# Patient Record
Sex: MALE | Race: BLACK OR AFRICAN AMERICAN | NOT HISPANIC OR LATINO | URBAN - METROPOLITAN AREA
[De-identification: names, ages, dates, MRNs, and addresses within clinical notes are randomized per-mention and may not be internally consistent; named-entity substitution may affect disease eponyms.]

---

## 2023-04-18 ENCOUNTER — HOSPITAL ENCOUNTER (EMERGENCY)
Facility: HOSPITAL | Age: 38
Discharge: HOME | End: 2023-04-18
Attending: INTERNAL MEDICINE
Payer: MEDICAID

## 2023-04-18 VITALS
WEIGHT: 315 LBS | RESPIRATION RATE: 20 BRPM | OXYGEN SATURATION: 99 % | BODY MASS INDEX: 45.1 KG/M2 | HEIGHT: 70 IN | DIASTOLIC BLOOD PRESSURE: 76 MMHG | TEMPERATURE: 97 F | HEART RATE: 106 BPM | SYSTOLIC BLOOD PRESSURE: 130 MMHG

## 2023-04-18 DIAGNOSIS — M54.50 LUMBAR PAIN: ICD-10-CM

## 2023-04-18 DIAGNOSIS — M54.50 ACUTE RIGHT-SIDED LOW BACK PAIN WITHOUT SCIATICA: Primary | ICD-10-CM

## 2023-04-18 DIAGNOSIS — M25.561 ACUTE PAIN OF RIGHT KNEE: ICD-10-CM
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
## 2023-04-18 PROCEDURE — 99284 EMERGENCY DEPT VISIT MOD MDM: CPT

## 2023-04-18 RX ORDER — CYCLOBENZAPRINE HCL 5 MG
10 TABLET ORAL 3 TIMES DAILY PRN
Qty: 20 TABLET | Refills: 0 | Status: SHIPPED | OUTPATIENT
Start: 2023-04-18 | End: 2023-04-28

## 2023-04-18 RX ORDER — IBUPROFEN 600 MG/1
600 TABLET ORAL EVERY 6 HOURS PRN
Qty: 20 TABLET | Refills: 0 | Status: SHIPPED | OUTPATIENT
Start: 2023-04-18

## 2023-04-19 NOTE — ED PROVIDER NOTES
Encounter Date: 4/18/2023       History     Chief Complaint   Patient presents with    Fall     Right knee and lower back pain after slip and fall at [facility].     38 yo male with no significant past medical history who arrives to emergency department by ambulance with complaints of right knee and right sided lumbar pain after a fall. He is sitting in wheelchair during interview. Patient states he slipped and fell forward at a restaurant prior to arrival due to wet floor. He was not able to get up on his own and had to be carried to the stretcher. Reports severe pain when he attempts to ambulate. States pain is worsened on flexion of right knee. Denies tingling, numbness, radiating pain, or head trauma.       Review of patient's allergies indicates:  No Known Allergies  No past medical history on file.  No past surgical history on file.  No family history on file.     Review of Systems   Constitutional:  Negative for chills, diaphoresis and fever.   HENT: Negative.     Eyes: Negative.    Respiratory:  Negative for shortness of breath and wheezing.    Cardiovascular:  Negative for chest pain, palpitations and leg swelling.   Gastrointestinal: Negative.    Endocrine: Negative.    Genitourinary: Negative.    Musculoskeletal:  Positive for back pain, gait problem and joint swelling. Negative for neck pain and neck stiffness.   Skin: Negative.    Allergic/Immunologic: Negative.    Neurological:  Positive for weakness. Negative for dizziness, light-headedness, numbness and headaches.   Hematological: Negative.    Psychiatric/Behavioral: Negative.       Physical Exam     Initial Vitals [04/18/23 2208]   BP Pulse Resp Temp SpO2   130/76 106 20 97.2 °F (36.2 °C) 99 %      MAP       --         Physical Exam    Constitutional: He is not diaphoretic. No distress.   HENT:   Head: Normocephalic and atraumatic.   Eyes: Conjunctivae and EOM are normal. Pupils are equal, round, and reactive to light.   Neck:   Normal range of  motion.  Cardiovascular:  Normal rate, regular rhythm and normal heart sounds.           No murmur heard.  Pulmonary/Chest: Breath sounds normal. No respiratory distress.   Abdominal: Abdomen is soft.   Musculoskeletal:         General: Tenderness present. No edema.      Cervical back: Normal range of motion.      Lumbar back: Tenderness present. No edema, deformity, lacerations or bony tenderness. Decreased range of motion.      Right knee: Swelling present. No deformity, effusion, erythema, ecchymosis, lacerations or crepitus. Decreased range of motion. Tenderness present. Normal alignment, normal meniscus and normal patellar mobility.      Left knee: Normal.      Comments: Right knee swelling, tender to palpation anteriorly. Limited range of motion. Difficulty flexing knee past 90 degrees. Patient unstable when walking. No bleeding, lacerations, or deformities.      Neurological: He is alert and oriented to person, place, and time.   Skin: Skin is warm and dry.   Psychiatric: He has a normal mood and affect.       ED Course   Procedures  Labs Reviewed - No data to display       Imaging Results    None          Medications - No data to display  Medical Decision Making:   ED Management:  Patient seen in ED for back and right knee pain after a fall this evening. Afebrile and vital signs stable. He arrives by ambulance. On physical exam there is swelling to right knee, tenderness anteriorly, and limited range of motion of right knee. Ambulation is limited due to pain and weakness. [place] knee score 0. He will be discharged home with muscle relaxant and ibuprofen for pain control. Instructed him to follow up with his PCP.                         Clinical Impression:   Final diagnoses:  [W19.XXXA] Fall, initial encounter  [M25.561] Acute pain of right knee  [M54.50] Lumbar pain        ED Disposition Condition    Discharge Stable          ED Prescriptions       Medication Sig Dispense Start Date End Date Auth. Provider     ibuprofen (ADVIL,MOTRIN) 600 MG tablet Take 1 tablet (600 mg total) by mouth every 6 (six) hours as needed for Pain. 20 tablet 4/18/2023 -- Koki Acevedo MD    cyclobenzaprine (FLEXERIL) 5 MG tablet Take 2 tablets (10 mg total) by mouth 3 (three) times daily as needed for Muscle spasms. 20 tablet 4/18/2023 4/28/2023 Koki Acevedo MD          Follow-up Information    None          Koki Acevedo MD  Resident  04/18/23 2274